# Patient Record
(demographics unavailable — no encounter records)

---

## 2024-11-06 NOTE — HISTORY OF PRESENT ILLNESS
[FreeTextEntry1] : This is a 59-year-old male who presents for neurosurgical assessment with regards to cervical and lumbar pain complaints.  He notes continued neck pain and soreness into the bilateral trapezius region with numbness and tingling into the hands, most notably within the 1st-2nd digits. Additionally, he notes low back pain with radiating pain into the left lower extremity. Numbness, tingling, burning reported with activity restrictions.  IMAGING [facility] 10/31/2024 X-ray C spine- consideration for fracture of superior hardware at C5. No evidence of instability. 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 Endplate reactive changes. Left L5-S1 neuroforaminal narrowing  EXAM Constitutional: Well appearing, no distress HEENT: Normocephalic Atraumatic Psychiatric: Alert and oriented to all spheres, normal mood Pulmonary: No respiratory distress Neurologic: CN II-XII grossly intact Palpation: no pain to palpation Strength: Full strength in all major muscle groups Sensation: Full sensation to light touch in all extremities Reflexes:  2+ patellar  2+ biceps  2+ ankle jerk Mild restriction range of motion of the cervical and lumbar spine. Signs: SLR negative Gait: steady, walking w/o assistance.

## 2024-11-06 NOTE — ASSESSMENT
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  Patient to contemplate neurosurgical options to the lumbosacral region. - discussed left L5-S1 foraminotomy (he had one in the past) vs MIS left L5-S1 TLIF.  In the interim, orthopedic referral given with regards to left hand. EMG testing also ordered.  Return to office 6 weeks.  JORGE Chan MD  Detail Level: Zone